# Patient Record
Sex: FEMALE | Race: WHITE | HISPANIC OR LATINO | Employment: FULL TIME | URBAN - METROPOLITAN AREA
[De-identification: names, ages, dates, MRNs, and addresses within clinical notes are randomized per-mention and may not be internally consistent; named-entity substitution may affect disease eponyms.]

---

## 2017-10-11 ENCOUNTER — OCCUPATIONAL MEDICINE (OUTPATIENT)
Dept: URGENT CARE | Facility: PHYSICIAN GROUP | Age: 27
End: 2017-10-11
Payer: COMMERCIAL

## 2017-10-11 VITALS
HEART RATE: 77 BPM | RESPIRATION RATE: 16 BRPM | BODY MASS INDEX: 22.08 KG/M2 | DIASTOLIC BLOOD PRESSURE: 60 MMHG | SYSTOLIC BLOOD PRESSURE: 108 MMHG | TEMPERATURE: 98.3 F | WEIGHT: 120 LBS | OXYGEN SATURATION: 98 % | HEIGHT: 62 IN

## 2017-10-11 DIAGNOSIS — M54.50 ACUTE MIDLINE LOW BACK PAIN WITHOUT SCIATICA: ICD-10-CM

## 2017-10-11 DIAGNOSIS — S30.0XXA CONTUSION OF LOWER BACK, INITIAL ENCOUNTER: Primary | ICD-10-CM

## 2017-10-11 DIAGNOSIS — Z02.1 PRE-EMPLOYMENT DRUG SCREENING: ICD-10-CM

## 2017-10-11 LAB
BREATH ALCOHOL COMMENT: NORMAL
POC BREATHALIZER: 0 PERCENT (ref 0–0.01)

## 2017-10-11 PROCEDURE — 99203 OFFICE O/P NEW LOW 30 MIN: CPT | Mod: 29,25 | Performed by: PHYSICIAN ASSISTANT

## 2017-10-11 PROCEDURE — 82075 ASSAY OF BREATH ETHANOL: CPT | Mod: 29 | Performed by: PHYSICIAN ASSISTANT

## 2017-10-11 PROCEDURE — 80305 DRUG TEST PRSMV DIR OPT OBS: CPT | Mod: 29 | Performed by: PHYSICIAN ASSISTANT

## 2017-10-11 ASSESSMENT — ENCOUNTER SYMPTOMS
NEUROLOGICAL NEGATIVE: 1
CARDIOVASCULAR NEGATIVE: 1
PSYCHIATRIC NEGATIVE: 1
RESPIRATORY NEGATIVE: 1
MYALGIAS: 1
BACK PAIN: 1
EYES NEGATIVE: 1
CONSTITUTIONAL NEGATIVE: 1
GASTROINTESTINAL NEGATIVE: 1

## 2017-10-11 ASSESSMENT — PAIN SCALES - GENERAL: PAINLEVEL: 6=MODERATE PAIN

## 2017-10-11 NOTE — LETTER
"EMPLOYEE’S CLAIM FOR COMPENSATION/ REPORT OF INITIAL TREATMENT  FORM C-4    EMPLOYEE’S CLAIM - PROVIDE ALL INFORMATION REQUESTED   First Name  Ada Last Name  Sharri Odonnell Birthdate                    1990                Sex  female Claim Number   Home Address  [address] Age  27 y.o. 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 (if applicable)  picking product    How did this injury or occupational disease occur? (Be specific an answer in detail. Use additional sheet if necessary)  injured my lower back, a product fell on me.   If you believe that you have an occupational disease, when did you first have knowledge of the disability and it relationship to your employment?  no Witnesses to the Accident  no      Nature of Injury or Occupational Disease  Strain  Part(s) of Body Injured or Affected  Lower Back Area (Lumbar Area & Lumbo-Sacral), Hip (L), Hip (R)    I certify that the above is true and correct to the best of my knowledge and that I have provided this information in order to obtain the benefits of [place]’s [place] Insurance and " [place] or [place] of [place]  I hereby authorize any physician, chiropractor, surgeon, practitioner, or other person, any hospital, including [facility] or [facility], any medical service organization, any insurance company, or other institution or organization to release to each other, any medical or other information, including benefits paid or payable, pertinent to this injury or disease, except information relative to diagnosis, treatment and/or counseling for AIDS, psychological conditions, alcohol or controlled substances, for which I must give specific authorization.  A Photostat of this authorization shall be as valid as the original.     Date   Place   Employee’s Signature   THIS REPORT MUST BE COMPLETED AND MAILED WITHIN 3 WORKING DAYS OF TREATMENT   Place  [facility]  Name of Facility  [facility]   Date  10/11/2017 Diagnosis  (S30.0XXA) Contusion of lower back, initial encounter  (primary encounter diagnosis)  (Z02.1) Pre-employment drug screening  (M54.5) Acute midline low back pain without sciatica Is there evidence the injured employee was under the influence of alcohol and/or another controlled substance at the time of accident?   Hour  5:28 PM Description of Injury or Disease  The primary encounter diagnosis was Contusion of lower back, initial encounter. Diagnoses of Pre-employment drug screening and Acute midline low back pain without sciatica were also pertinent to this visit. No   Treatment   Tylenol 500mg every 6 hours as needed for pain.  Heat and ice.  Stretches.  Have you advised the patient to remain off work five days or more? No   X-Ray Findings      If Yes   From Date  To Date      From information given by the employee, together with medical evidence, can you directly connect this injury or occupational disease as job incurred?  Yes If No Full Duty  No Modified Duty  Yes   Is additional medical  "care by a physician indicated?  Yes If Modified Duty, Specify any Limitations / Restrictions  Recommend limiting weight lifting to 10lbs.  No squatting, climbing, stooping, and bending.   Do you know of any previous injury or disease contributing to this condition or occupational disease?                            No   Date  10/11/2017 Print Doctor’s Name Memo Stewart P.A.-C. I certify the employer’s copy of  this form was mailed on:   Address  [address] [place] [address]. [address] Insurer’s Use Only     [facility]  [address]    Provider’s Tax ID Number  [id number] Telephone  Dept: [phone]            e-Signature: Dr. Jordi Dias, Medical Director Degree  [place]        ORIGINAL-TREATING PHYSICIAN OR CHIROPRACTOR    PAGE 2-INSURER/TPA    PAGE 3-EMPLOYER    PAGE 4-EMPLOYEE             Form C-4 (rev10/07)              BRIEF DESCRIPTION OF RIGHTS AND BENEFITS  (Pursuant to NRS 616C.050)    Notice of Injury or Occupational Disease (Incident Report Form C-1): If an injury or occupational disease (OD) arises out of and in the  course of employment, you must provide written notice to your employer as soon as practicable, but no later than 7 days after the accident or  OD. Your employer shall maintain a sufficient supply of the required forms.    Claim for Compensation (Form C-4): If medical treatment is sought, the form C-4 is available at the place of initial treatment. A completed  \"Claim for Compensation\" (Form C-4) must be filed within 90 days after an accident or OD. The treating physician or chiropractor must,  within 3 working days after treatment, complete and mail to the employer, the employer's insurer and third-party , the Claim for  Compensation.    Medical Treatment: If you require medical treatment for your on-the-job injury or OD, you may be required to select a physician or  chiropractor from a list provided by your workers’ compensation insurer, if it has contracted with an " Organization for Managed Care (MCO) or  Preferred Provider Organization (PPO) or providers of health care. If your employer has not entered into a contract with an MCO or PPO, you  may select a physician or chiropractor from the Panel of Physicians and Chiropractors. Any medical costs related to your industrial injury or  OD will be paid by your insurer.    Temporary Total Disability (TTD): If your doctor has certified that you are unable to work for a period of at least 5 consecutive days, or 5  cumulative days in a 20-day period, or places restrictions on you that your employer does not accommodate, you may be entitled to TTD  compensation.    Temporary Partial Disability (TPD): If the wage you receive upon reemployment is less than the compensation for TTD to which you are  entitled, the insurer may be required to pay you TPD compensation to make up the difference. TPD can only be paid for a maximum of 24  months.    Permanent Partial Disability (PPD): When your medical condition is stable and there is an indication of a PPD as a result of your injury or  OD, within 30 days, your insurer must arrange for an evaluation by a rating physician or chiropractor to determine the degree of your PPD. The  amount of your PPD award depends on the date of injury, the results of the PPD evaluation and your age and wage.    Permanent Total Disability (PTD): If you are medically certified by a treating physician or chiropractor as permanently and totally disabled  and have been granted a PTD status by your insurer, you are entitled to receive monthly benefits not to exceed 66 2/3% of your average  monthly wage. The amount of your PTD payments is subject to reduction if you previously received a PPD award.    Vocational Rehabilitation Services: You may be eligible for vocational rehabilitation services if you are unable to return to the job due to a  permanent physical impairment or permanent restrictions as a result of your  injury or occupational disease.    Transportation and Per Bruce Reimbursement: You may be eligible for travel expenses and per bruce associated with medical treatment.    Reopening: You may be able to reopen your claim if your condition worsens after claim closure.    Appeal Process: If you disagree with a written determination issued by the insurer or the insurer does not respond to your request, you may  appeal to the Department of Administration, , by following the instructions contained in your determination letter. You must  appeal the determination within 70 days from the date of the determination letter at [address], [address], [place], [place]  [address], or [address], [address], [place], [place] [address]. If you disagree with the  decision, you may appeal to the  Department of Administration, . You must file your appeal within 30 days from the date of the  decision  letter at [address], [address], [place], [place] [address], or [address] [facility] [address], [place] [address]. If you  disagree with a decision of an , you may file a petition for judicial review with the District Court. You must do so within 30  days of the Appeal Officer’s decision. You may be represented by an  at your own expense or you may contact the [facility] for possible  representation.    [place]  for Injured Workers (NAIW): If you disagree with a  decision, you may request that NAIW represent you  without charge at an  Hearing. For information regarding denial of benefits, you may contact the [facility] at: [address], [address], [place] [address], [phone], or [address] [facility] [facility] [address], [place] [address], [phone]    To File a Complaint with the Division: If you wish to file a complaint with the  of the Division of Industrial Relations  (DIR),  please contact the Workers’ Compensation Section, [address], [address], [place], [place] [address], telephone [phone], or  [address], [address], [place], [place] [address], telephone [phone].    For assistance with Workers’ Compensation Issues: you may contact the Office of the [facility] Consumer Health Assistance, [address] [place] [address], [address], [place], [place] [address], Toll Free 1-974.869.6854, Web site: http://govcha.[facility].nv., E-mail  [email or web address].                                                                                                                                                                                                                                   __________________________________________________________________                                                                   _________________                Employee Name / Signature                                                                                                                                                       Date                                                                                                                                                                                                     D-2 (rev. 10/07)

## 2017-10-11 NOTE — LETTER
[facility]  [address] - [place] [address]  Phone:  [phone] - Fax:  [phone]   [facility] Progress Report and Disability Certification  Date of Service: 10/11/2017   No Show:  No  Date / Time of Next Visit: 10/16/2017 @ 4:00 PM   Claim Information   Patient Name: Carla Odonnell  Claim Number:     Employer: [employer]  Date of Injury: 10/9/2017     Insurer / TPA: [employer]  ID / SSN:     Occupation: Picking  Diagnosis: The primary encounter diagnosis was Contusion of lower back, initial encounter. Diagnoses of Pre-employment drug screening and Acute midline low back pain without sciatica were also pertinent to this visit.    Medical Information   Related to Industrial Injury? Yes    Subjective Complaints:  DOI 10/9/2017:  FRANKI: was picking up a box from knee level and a box from an above shelf 35-40lbs fell from 6 feet and landed onto the low back.  Hurts along the whole back with no radiation.  Pain is 6/10.  Has tried icy hot pads with minimal improvement.  No previous back injuries.  No second jobs.   Objective Findings: Spine: No ecchymosis or erythema or ecchymosis present.  No step-off deformities  Point ttp over the lower back.    FROM for spinal flexion, extension, lateral flexion, and rotation.  Pain with all ROM but greatest with flexion.  Strength 5/5 for LE bilaterally.    DTR 2+ and equal bilaterally for patella and achilles.    Sensation equal bilaterally to light touch for L4, L5, S1, and S2. No clonus or fasiculations noted. Neurovascular status intact, DP 2+.    Gait and station wnl, non antalgic.   Pre-Existing Condition(s):     Assessment:   Initial Visit    Status: Additional Care Required  Permanent Disability:No    Plan:   Comments:Tylenol 500m every 6 hours as needed for pain.  Heat and ice.  Stretches.    Diagnostics:      Comments:       Disability Information   Status: Released to  Restricted Duty    From:  10/11/2017  Through: 10/16/2017 Restrictions are:     Physical Restrictions   Sitting:    Standing:    Stoopin hrs/day Bendin hrs/day   Squattin hrs/day Walking:    Climbin hrs/day Pushing:      Pulling:    Other:    Reaching Above Shoulder (L):   Reaching Above Shoulder (R):       Reaching Below Shoulder (L):    Reaching Below Shoulder (R):      Not to exceed Weight Limits   Carrying(hrs): 10 Weight Limit(lb): < or = to 10 pounds Lifting(hrs): 10 Weight  Limit(lb): < or = to 10 pounds   Comments: Recommend limiting weight lifting to 10lbs.  No squatting, climbing, stooping, and bending.    Repetitive Actions   Hands: i.e. Fine Manipulations from Grasping:     Feet: i.e. Operating Foot Controls:     Driving / Operate Machinery:     Physician Name: Memo Stewart P.A.-C. Physician Signature:   e-Signature: Dr. Jordi Dias, Medical Director   Clinic Name / Location: [address]  [place] [address] Clinic Phone Number: Dept: [phone]   Appointment Time: 5:20 Pm Visit Start Time: 5:28 PM   Check-In Time:  5:19 Pm Visit Discharge Time:  6:09 PM   Original-Treating Physician or Chiropractor    Page 2-Insurer/TPA    Page 3-Employer    Page 4-Employee

## 2017-10-12 NOTE — PROGRESS NOTES
"Subjective:      Carla Odonnell is a 27 y.o. female who presents with Back Injury (WC IV 10/09/17 Low Back Injury. Box fell on Pt and injured low back. Pain and stiffness.)      DOI 10/9/2017:  FRANKI: was picking up a box from knee level and a box from an above shelf 35-40lbs fell from 6 feet and landed onto the low back.  Hurts along the whole back with no radiation.  Pain is 6/10.  Has tried icy hot pads with minimal improvement.  No previous back injuries.  No second jobs.     HPI    Review of Systems   Constitutional: Negative.    HENT: Negative.    Eyes: Negative.    Respiratory: Negative.    Cardiovascular: Negative.    Gastrointestinal: Negative.    Genitourinary: Negative.    Musculoskeletal: Positive for back pain and myalgias.   Skin: Negative.    Neurological: Negative.    Endo/Heme/Allergies: Negative.    Psychiatric/Behavioral: Negative.           Objective:     /60   Pulse 77   Temp 36.8 °C (98.3 °F)   Resp 16   Ht 1.575 m (5' 2\")   Wt 54.4 kg (120 lb)   SpO2 98%   Breastfeeding? No   BMI 21.95 kg/m²      Physical Exam     Nursing note and vitals reviewed.    Constitutional:  Appropriately groomed, pleasant affect, well nourished, and in no acute distress.    HEENT:  Head: Atraumatic, normocephalic.    Ears:  Hearing grossly intact to voice.    Eyes:  Conjunctivae clear, sclera white, and medial canthus without exudate bilaterally.    Lungs:  Lungs with normal respiratory excursion and effort.      Spine: No ecchymosis or erythema or ecchymosis present.  No step-off deformities  Point ttp over the lower back.    FROM for spinal flexion, extension, lateral flexion, and rotation.  Pain with all ROM but greatest with flexion.  Strength 5/5 for LE bilaterally.    DTR 2+ and equal bilaterally for patella and achilles.    Sensation equal bilaterally to light touch for L4, L5, S1, and S2. No clonus or fasiculations noted. Neurovascular status intact, DP 2+.    Gait and station wnl, non " antalgic.     Derm:  No rashes or lesions with good turgor pressure.     Psychiatric:  Normal judgement, mood and affect.      Assessment/Plan:     2. Acute midline low back pain without sciatica      3. Contusion of lower back, initial encounter  Patient presents with acute low back pain and contusion after a 30/5/40 pound box landed from approximately 6 feet above the patient was bent forward lifting a box. On exam no tenderness along the spine. Pain along the erector spinae. No focal neurological deficits. No change in bowel or bladder habits, saddle anesthesia, paresthesias, or numbness. Recommended heat and ice and Tylenol. Patient is approximately 2 months pregnant.  Work restrictions per D39. Patient follow-up on Monday for reevaluation. May be released to full capacity for trial or discharge if showing MMI.

## 2017-10-12 NOTE — PATIENT INSTRUCTIONS
Tylenol or acetaminophen: 650mg every 6-8 hours.      Contusión   (Contusion)   Katherin contusión es un hematoma interno. Las contusiones ocurren cuando un traumatismo causa un sangrado debajo de la piel. Los signos de hematoma son dolor, inflamación (hinchazón) y cambio de color en la piel. La contusión puede volverse austyn, púrpura o amarilla.  CUIDADOS EN EL HOGAR   · Aplique hielo sobre la laureen lesionada.  ¨ Ponga el hielo en katherin bolsa plástica.  ¨ Colóquese katherin toalla entre la piel y la bolsa de hielo.  ¨ Deje el hielo karthik 15 a 20 minutos, 3 a 4 veces por día.  · Sólo tome los medicamentos según le indique el médico.  · Caitie que la laureen lesionada repose.  · En lo posible, levante (eleve) la laureen lesionada para disminuir la hinchazón.  SOLICITE AYUDA DE INMEDIATO SI:   · El hematoma o la hinchazón aumentan.  · Siente que el dolor empeora.  · La hinchazón o el dolor no se alivian con los medicamentos.  ASEGÚRESE DE QUE:   · Comprende estas instrucciones.  · Controlará wood enfermedad.  · Solicitará ayuda de inmediato si no mejora o si empeora.     Esta información no tiene hailey fin reemplazar el consejo del médico. Asegúrese de hacerle al médico cualquier pregunta que tenga.     Document Released: 12/06/2012 Document Revised: 03/11/2013  Elsevier Interactive Patient Education ©2016 Elsevier Inc.

## 2017-10-16 ENCOUNTER — OCCUPATIONAL MEDICINE (OUTPATIENT)
Dept: URGENT CARE | Facility: PHYSICIAN GROUP | Age: 27
End: 2017-10-16
Payer: COMMERCIAL

## 2017-10-16 VITALS
WEIGHT: 120 LBS | RESPIRATION RATE: 16 BRPM | TEMPERATURE: 98.6 F | HEART RATE: 78 BPM | HEIGHT: 62 IN | DIASTOLIC BLOOD PRESSURE: 54 MMHG | BODY MASS INDEX: 22.08 KG/M2 | SYSTOLIC BLOOD PRESSURE: 94 MMHG | OXYGEN SATURATION: 99 %

## 2017-10-16 DIAGNOSIS — S20.229D CONTUSION OF BACK, UNSPECIFIED LATERALITY, SUBSEQUENT ENCOUNTER: ICD-10-CM

## 2017-10-16 PROCEDURE — 99213 OFFICE O/P EST LOW 20 MIN: CPT | Mod: 29 | Performed by: PHYSICIAN ASSISTANT

## 2017-10-16 ASSESSMENT — ENCOUNTER SYMPTOMS
CONSTITUTIONAL NEGATIVE: 1
NEUROLOGICAL NEGATIVE: 1
GASTROINTESTINAL NEGATIVE: 1

## 2017-10-16 ASSESSMENT — PAIN SCALES - GENERAL: PAINLEVEL: NO PAIN

## 2017-10-16 NOTE — LETTER
"   [facility] [address] - [place] [address]  Phone:  [phone] - Fax:  [phone]   [facility] Progress Report and Disability Certification  Date of Service: 10/16/2017   No Show:  No  Date / Time of Next Visit:     Claim Information   Patient Name: Carla Odonnell  Claim Number:     Employer: [employer]  Date of Injury: 10/9/2017     Insurer / TPA:   [employer]  ID / SSN:     Occupation: Picking  Diagnosis: The encounter diagnosis was Contusion of back, unspecified laterality, subsequent encounter.    Medical Information   Related to Industrial Injury? Yes    Subjective Complaints:  DOI 10/9/2017:  Initial follow up. \"FRANKI: was picking up a box from knee level and a box from an above shelf 35-40lbs fell from 6 feet and landed onto the low back.  Hurt along the whole back with no radiation.  Initial pain was 6/10.   Pain is currently 0/10 and she wishes to return to full duty.   She states there is no back or hip pain and has FROM.  No numbness, tingling or extremity weakness. Took Tylenol, iced with good result   Objective Findings: Vitals reviewed  Back: on inspection there is no swelling, ecchymosis or deformity.  No midline TTP.  No  paraspinous TTP mid-lower lumbar region.  FROM of back without pain. Bilateral lower extremities with 5/5 strength, normal sensation throughout and 2+ DTRs.  Normal gait.    Pre-Existing Condition(s): None   Assessment:   Condition Improved    Status: Discharged /  MMI  Permanent Disability:No    Plan:      Diagnostics:      Comments:       Disability Information   Status: Released to Full Duty    From:     Through:   Restrictions are:     Physical Restrictions   Sitting:    Standing:    Stooping:    Bending:      Squatting:    Walking:    Climbing:    Pushing:      Pulling:    Other:    Reaching Above Shoulder (L):   Reaching Above Shoulder (R):       Reaching Below Shoulder (L):    Reaching Below " Shoulder (R):      Not to exceed Weight Limits   Carrying(hrs):   Weight Limit(lb):   Lifting(hrs):   Weight  Limit(lb):     Comments: Benign exam, recovered from low back contusion/injury  Full duty  MMI    Repetitive Actions   Hands: i.e. Fine Manipulations from Grasping:     Feet: i.e. Operating Foot Controls:     Driving / Operate Machinery:     Physician Name: Josesito Sherman P.A.-C. Physician Signature: JOSESITO Browning P.A.-C. e-Signature: Dr. Jordi Dias, Medical Director   Clinic Name / Location: [address]  [place] [address] Clinic Phone Number: Dept: [phone]   Appointment Time: 4:00 Pm Visit Start Time: 5:05 PM   Check-In Time:  4:58 Pm Visit Discharge Time:  5:35PM   Original-Treating Physician or Chiropractor    Page 2-Insurer/TPA    Page 3-Employer    Page 4-Employee

## 2017-10-17 NOTE — PROGRESS NOTES
"Subjective:      Carla Odonnell is a 27 y.o. female who presents with Follow-Up (WC [facility] lower back w/ bilateral hip pain [employer].  PT states since last visit her back and hips are feeling better, with zero pain in back and hips.)      DOI 10/9/2017:  Initial follow up. \"FRANKI: was picking up a box from knee level and a box from an above shelf 35-40lbs fell from 6 feet and landed onto the low back.  Hurt along the whole back with no radiation.  Initial pain was 6/10.   Pain is currently 0/10 and she wishes to return to full duty.   She states there is no back or hip pain and has FROM.  No numbness, tingling or extremity weakness. Took Tylenol, iced with good result     HPI  As above.     Review of Systems   Constitutional: Negative.    Gastrointestinal: Negative.    Musculoskeletal:        SEE HPI   Neurological: Negative.        PMH:  has no past medical history on file.  MEDS:   Current Outpatient Prescriptions:   •  Acetaminophen (TYLENOL PO), Take  by mouth., Disp: , Rfl:   ALLERGIES: No Known Allergies  SURGHX: No past surgical history on file.  SOCHX:  reports that she has never smoked. She has never used smokeless tobacco.  FH: Family history was reviewed, no pertinent findings to report     Objective:     BP (!) 94/54   Pulse 78   Temp 37 °C (98.6 °F)   Resp 16   Ht 1.575 m (5' 2\")   Wt 54.4 kg (120 lb)   SpO2 99%   BMI 21.95 kg/m²      Physical Exam   Constitutional: She is oriented to person, place, and time. She appears well-developed and well-nourished. No distress.   Cardiovascular: Normal rate.    Pulmonary/Chest: Effort normal.   Neurological: She is alert and oriented to person, place, and time.   Skin: Skin is warm and dry.   Psychiatric: She has a normal mood and affect. Her behavior is normal.     Vitals reviewed  Back: on inspection there is no swelling, ecchymosis or deformity.  No midline TTP.  No  paraspinous TTP mid-lower lumbar region.  FROM of back without pain. Bilateral lower " extremities with 5/5 strength, normal sensation throughout and 2+ DTRs.  Normal gait.        Assessment/Plan:     1. Contusion of back, unspecified laterality, subsequent encounter         -released to full duty  -I      Ghazal Sherman P.A.-C.

## 2017-12-26 ENCOUNTER — HOSPITAL ENCOUNTER (OUTPATIENT)
Facility: MEDICAL CENTER | Age: 27
End: 2017-12-26
Attending: OBSTETRICS & GYNECOLOGY | Admitting: OBSTETRICS & GYNECOLOGY
Payer: COMMERCIAL

## 2017-12-26 VITALS — RESPIRATION RATE: 16 BRPM | TEMPERATURE: 97.4 F

## 2017-12-26 LAB
APPEARANCE UR: CLEAR
COLOR UR AUTO: YELLOW
GLUCOSE UR QL STRIP.AUTO: NEGATIVE MG/DL
KETONES UR QL STRIP.AUTO: NEGATIVE MG/DL
LEUKOCYTE ESTERASE UR QL STRIP.AUTO: NEGATIVE
NITRITE UR QL STRIP.AUTO: NEGATIVE
PH UR STRIP.AUTO: 7 [PH]
PROT UR QL STRIP: NEGATIVE MG/DL
RBC UR QL AUTO: NEGATIVE
SP GR UR: 1.01

## 2017-12-26 PROCEDURE — 81002 URINALYSIS NONAUTO W/O SCOPE: CPT

## 2017-12-26 NOTE — PROGRESS NOTES
27 y.o.    Edc=  23.3 weeks    TOCO and US on.  VSS.  Pt presents to triage with c/o VB, denies UCs or LOF and states pos FM.   Pt states the bleeding began after intercourse on Saturday and was bright red.  Pt states it is no longer red, but is now brown.  I visualized the pt's pad and there is a small amount of old blood seen.  1340-UA done-WNL, see epic.  Pt is not gilma.   Dr Rangel phoned and report given about previous findings.  Orders to dc pt home with labor precautions and general instructions to include no intercourse until she sees Dr. Cleveland.

## 2018-04-12 ENCOUNTER — HOSPITAL ENCOUNTER (INPATIENT)
Facility: MEDICAL CENTER | Age: 28
LOS: 1 days | End: 2018-04-14
Attending: OBSTETRICS & GYNECOLOGY | Admitting: OBSTETRICS & GYNECOLOGY
Payer: COMMERCIAL

## 2018-04-13 LAB
BASOPHILS # BLD AUTO: 0.4 % (ref 0–1.8)
BASOPHILS # BLD: 0.06 K/UL (ref 0–0.12)
EOSINOPHIL # BLD AUTO: 0.09 K/UL (ref 0–0.51)
EOSINOPHIL NFR BLD: 0.6 % (ref 0–6.9)
ERYTHROCYTE [DISTWIDTH] IN BLOOD BY AUTOMATED COUNT: 47.1 FL (ref 35.9–50)
HCT VFR BLD AUTO: 41.9 % (ref 37–47)
HGB BLD-MCNC: 13.9 G/DL (ref 12–16)
HOLDING TUBE BB 8507: NORMAL
IMM GRANULOCYTES # BLD AUTO: 0.19 K/UL (ref 0–0.11)
IMM GRANULOCYTES NFR BLD AUTO: 1.3 % (ref 0–0.9)
LYMPHOCYTES # BLD AUTO: 2.01 K/UL (ref 1–4.8)
LYMPHOCYTES NFR BLD: 14 % (ref 22–41)
MCH RBC QN AUTO: 29.6 PG (ref 27–33)
MCHC RBC AUTO-ENTMCNC: 33.2 G/DL (ref 33.6–35)
MCV RBC AUTO: 89.1 FL (ref 81.4–97.8)
MONOCYTES # BLD AUTO: 0.86 K/UL (ref 0–0.85)
MONOCYTES NFR BLD AUTO: 6 % (ref 0–13.4)
NEUTROPHILS # BLD AUTO: 11.1 K/UL (ref 2–7.15)
NEUTROPHILS NFR BLD: 77.7 % (ref 44–72)
NRBC # BLD AUTO: 0 K/UL
NRBC BLD-RTO: 0 /100 WBC
PLATELET # BLD AUTO: 305 K/UL (ref 164–446)
PMV BLD AUTO: 10.1 FL (ref 9–12.9)
RBC # BLD AUTO: 4.7 M/UL (ref 4.2–5.4)
WBC # BLD AUTO: 14.3 K/UL (ref 4.8–10.8)

## 2018-04-13 PROCEDURE — A9270 NON-COVERED ITEM OR SERVICE: HCPCS | Performed by: OBSTETRICS & GYNECOLOGY

## 2018-04-13 PROCEDURE — 85025 COMPLETE CBC W/AUTO DIFF WBC: CPT

## 2018-04-13 PROCEDURE — 59409 OBSTETRICAL CARE: CPT

## 2018-04-13 PROCEDURE — 700105 HCHG RX REV CODE 258: Performed by: OBSTETRICS & GYNECOLOGY

## 2018-04-13 PROCEDURE — 700102 HCHG RX REV CODE 250 W/ 637 OVERRIDE(OP): Performed by: OBSTETRICS & GYNECOLOGY

## 2018-04-13 PROCEDURE — 36415 COLL VENOUS BLD VENIPUNCTURE: CPT

## 2018-04-13 PROCEDURE — 304965 HCHG RECOVERY SERVICES

## 2018-04-13 PROCEDURE — 303615 HCHG EPIDURAL/SPINAL ANESTHESIA FOR LABOR

## 2018-04-13 PROCEDURE — 700111 HCHG RX REV CODE 636 W/ 250 OVERRIDE (IP)

## 2018-04-13 PROCEDURE — 700112 HCHG RX REV CODE 229: Performed by: OBSTETRICS & GYNECOLOGY

## 2018-04-13 PROCEDURE — 770002 HCHG ROOM/CARE - OB PRIVATE (112)

## 2018-04-13 RX ORDER — CARBOPROST TROMETHAMINE 250 UG/ML
250 INJECTION, SOLUTION INTRAMUSCULAR
Status: DISCONTINUED | OUTPATIENT
Start: 2018-04-13 | End: 2018-04-14 | Stop reason: HOSPADM

## 2018-04-13 RX ORDER — DOCUSATE SODIUM 100 MG/1
100 CAPSULE, LIQUID FILLED ORAL 2 TIMES DAILY PRN
Status: DISCONTINUED | OUTPATIENT
Start: 2018-04-13 | End: 2018-04-14 | Stop reason: HOSPADM

## 2018-04-13 RX ORDER — MAG HYDROX/ALUMINUM HYD/SIMETH 400-400-40
1 SUSPENSION, ORAL (FINAL DOSE FORM) ORAL
Status: DISCONTINUED | OUTPATIENT
Start: 2018-04-13 | End: 2018-04-14 | Stop reason: HOSPADM

## 2018-04-13 RX ORDER — MISOPROSTOL 200 UG/1
800 TABLET ORAL
Status: DISCONTINUED | OUTPATIENT
Start: 2018-04-13 | End: 2018-04-13 | Stop reason: HOSPADM

## 2018-04-13 RX ORDER — ONDANSETRON 2 MG/ML
4 INJECTION INTRAMUSCULAR; INTRAVENOUS EVERY 6 HOURS PRN
Status: DISCONTINUED | OUTPATIENT
Start: 2018-04-13 | End: 2018-04-14 | Stop reason: HOSPADM

## 2018-04-13 RX ORDER — ONDANSETRON 4 MG/1
4 TABLET, ORALLY DISINTEGRATING ORAL EVERY 6 HOURS PRN
Status: DISCONTINUED | OUTPATIENT
Start: 2018-04-13 | End: 2018-04-14 | Stop reason: HOSPADM

## 2018-04-13 RX ORDER — OXYCODONE AND ACETAMINOPHEN 10; 325 MG/1; MG/1
1 TABLET ORAL EVERY 4 HOURS PRN
Status: DISCONTINUED | OUTPATIENT
Start: 2018-04-13 | End: 2018-04-14 | Stop reason: HOSPADM

## 2018-04-13 RX ORDER — OXYCODONE HYDROCHLORIDE AND ACETAMINOPHEN 5; 325 MG/1; MG/1
1 TABLET ORAL EVERY 4 HOURS PRN
Status: DISCONTINUED | OUTPATIENT
Start: 2018-04-13 | End: 2018-04-14 | Stop reason: HOSPADM

## 2018-04-13 RX ORDER — BISACODYL 10 MG
10 SUPPOSITORY, RECTAL RECTAL PRN
Status: DISCONTINUED | OUTPATIENT
Start: 2018-04-13 | End: 2018-04-14 | Stop reason: HOSPADM

## 2018-04-13 RX ORDER — SODIUM CHLORIDE, SODIUM LACTATE, POTASSIUM CHLORIDE, CALCIUM CHLORIDE 600; 310; 30; 20 MG/100ML; MG/100ML; MG/100ML; MG/100ML
INJECTION, SOLUTION INTRAVENOUS CONTINUOUS
Status: DISCONTINUED | OUTPATIENT
Start: 2018-04-13 | End: 2018-04-13 | Stop reason: HOSPADM

## 2018-04-13 RX ORDER — METHYLERGONOVINE MALEATE 0.2 MG/ML
0.2 INJECTION INTRAVENOUS
Status: DISCONTINUED | OUTPATIENT
Start: 2018-04-13 | End: 2018-04-14 | Stop reason: HOSPADM

## 2018-04-13 RX ORDER — IBUPROFEN 600 MG/1
600 TABLET ORAL EVERY 6 HOURS PRN
Status: DISCONTINUED | OUTPATIENT
Start: 2018-04-13 | End: 2018-04-14 | Stop reason: HOSPADM

## 2018-04-13 RX ORDER — ALUMINA, MAGNESIA, AND SIMETHICONE 2400; 2400; 240 MG/30ML; MG/30ML; MG/30ML
30 SUSPENSION ORAL EVERY 6 HOURS PRN
Status: DISCONTINUED | OUTPATIENT
Start: 2018-04-13 | End: 2018-04-13 | Stop reason: HOSPADM

## 2018-04-13 RX ORDER — IBUPROFEN 800 MG/1
800 TABLET ORAL EVERY 6 HOURS PRN
Status: DISCONTINUED | OUTPATIENT
Start: 2018-04-13 | End: 2018-04-14

## 2018-04-13 RX ORDER — VITAMIN A ACETATE, BETA CAROTENE, ASCORBIC ACID, CHOLECALCIFEROL, .ALPHA.-TOCOPHEROL ACETATE, DL-, THIAMINE MONONITRATE, RIBOFLAVIN, NIACINAMIDE, PYRIDOXINE HYDROCHLORIDE, FOLIC ACID, CYANOCOBALAMIN, CALCIUM CARBONATE, FERROUS FUMARATE, ZINC OXIDE, CUPRIC OXIDE 3080; 12; 120; 400; 1; 1.84; 3; 20; 22; 920; 25; 200; 27; 10; 2 [IU]/1; UG/1; MG/1; [IU]/1; MG/1; MG/1; MG/1; MG/1; MG/1; [IU]/1; MG/1; MG/1; MG/1; MG/1; MG/1
1 TABLET, FILM COATED ORAL EVERY MORNING
Status: DISCONTINUED | OUTPATIENT
Start: 2018-04-13 | End: 2018-04-14 | Stop reason: HOSPADM

## 2018-04-13 RX ORDER — BUPIVACAINE HYDROCHLORIDE 2.5 MG/ML
INJECTION, SOLUTION EPIDURAL; INFILTRATION; INTRACAUDAL
Status: ACTIVE
Start: 2018-04-13 | End: 2018-04-13

## 2018-04-13 RX ORDER — ROPIVACAINE HYDROCHLORIDE 2 MG/ML
INJECTION, SOLUTION EPIDURAL; INFILTRATION; PERINEURAL
Status: COMPLETED
Start: 2018-04-13 | End: 2018-04-13

## 2018-04-13 RX ORDER — ACETAMINOPHEN 325 MG/1
325 TABLET ORAL EVERY 4 HOURS PRN
Status: DISCONTINUED | OUTPATIENT
Start: 2018-04-13 | End: 2018-04-14 | Stop reason: HOSPADM

## 2018-04-13 RX ORDER — OXYCODONE HYDROCHLORIDE AND ACETAMINOPHEN 5; 325 MG/1; MG/1
1 TABLET ORAL EVERY 4 HOURS PRN
Status: DISCONTINUED | OUTPATIENT
Start: 2018-04-13 | End: 2018-04-14

## 2018-04-13 RX ORDER — SODIUM CHLORIDE, SODIUM LACTATE, POTASSIUM CHLORIDE, CALCIUM CHLORIDE 600; 310; 30; 20 MG/100ML; MG/100ML; MG/100ML; MG/100ML
INJECTION, SOLUTION INTRAVENOUS PRN
Status: DISCONTINUED | OUTPATIENT
Start: 2018-04-13 | End: 2018-04-14 | Stop reason: HOSPADM

## 2018-04-13 RX ORDER — MISOPROSTOL 200 UG/1
600 TABLET ORAL
Status: DISCONTINUED | OUTPATIENT
Start: 2018-04-13 | End: 2018-04-14 | Stop reason: HOSPADM

## 2018-04-13 RX ADMIN — SODIUM CHLORIDE, POTASSIUM CHLORIDE, SODIUM LACTATE AND CALCIUM CHLORIDE: 600; 310; 30; 20 INJECTION, SOLUTION INTRAVENOUS at 01:25

## 2018-04-13 RX ADMIN — Medication 125 ML/HR: at 06:15

## 2018-04-13 RX ADMIN — OXYCODONE HYDROCHLORIDE AND ACETAMINOPHEN 1 TABLET: 10; 325 TABLET ORAL at 18:19

## 2018-04-13 RX ADMIN — OXYCODONE HYDROCHLORIDE AND ACETAMINOPHEN 1 TABLET: 10; 325 TABLET ORAL at 12:06

## 2018-04-13 RX ADMIN — OXYCODONE HYDROCHLORIDE AND ACETAMINOPHEN 1 TABLET: 5; 325 TABLET ORAL at 23:59

## 2018-04-13 RX ADMIN — SODIUM CHLORIDE, POTASSIUM CHLORIDE, SODIUM LACTATE AND CALCIUM CHLORIDE: 600; 310; 30; 20 INJECTION, SOLUTION INTRAVENOUS at 01:50

## 2018-04-13 RX ADMIN — IBUPROFEN 600 MG: 600 TABLET, FILM COATED ORAL at 18:19

## 2018-04-13 RX ADMIN — ROPIVACAINE HYDROCHLORIDE 100 ML: 2 INJECTION, SOLUTION EPIDURAL; INFILTRATION at 02:00

## 2018-04-13 RX ADMIN — DOCUSATE SODIUM 100 MG: 100 CAPSULE ORAL at 23:59

## 2018-04-13 RX ADMIN — Medication 2000 ML/HR: at 05:32

## 2018-04-13 RX ADMIN — IBUPROFEN 600 MG: 600 TABLET, FILM COATED ORAL at 08:34

## 2018-04-13 RX ADMIN — Medication 1 TABLET: at 08:34

## 2018-04-13 ASSESSMENT — LIFESTYLE VARIABLES
ALCOHOL_USE: NO
EVER_SMOKED: NEVER
EVER_SMOKED: NEVER
DO YOU DRINK ALCOHOL: NO

## 2018-04-13 ASSESSMENT — PATIENT HEALTH QUESTIONNAIRE - PHQ9
2. FEELING DOWN, DEPRESSED, IRRITABLE, OR HOPELESS: NOT AT ALL
1. LITTLE INTEREST OR PLEASURE IN DOING THINGS: NOT AT ALL
SUM OF ALL RESPONSES TO PHQ9 QUESTIONS 1 AND 2: 0

## 2018-04-13 ASSESSMENT — PAIN SCALES - GENERAL
PAINLEVEL_OUTOF10: 0
PAINLEVEL_OUTOF10: 2
PAINLEVEL_OUTOF10: 7
PAINLEVEL_OUTOF10: 6
PAINLEVEL_OUTOF10: 0
PAINLEVEL_OUTOF10: 0
PAINLEVEL_OUTOF10: 6

## 2018-04-13 NOTE — CARE PLAN
Problem: Altered physiologic condition related to immediate post-delivery state and potential for bleeding/hemorrhage  Goal: Patient physiologically stable as evidenced by normal lochia, palpable uterine involution and vital signs within normal limits  Outcome: PROGRESSING AS EXPECTED  Uterus firm, lochia light, pain controlled with PRN mediation (see MAR). VSS and WDL.     Problem: Potential for postpartum infection related to presence of episiotomy/vaginal tear and/or uterine contamination  Goal: Patient will be absent from signs and symptoms of infection  Outcome: PROGRESSING AS EXPECTED  No signs or symptoms of infection. VSS and WDL.

## 2018-04-13 NOTE — DELIVERY NOTE
"Delivery Summary    Carla Odonnell is a 26 yo  who presented at 38wk5d in active labor. She received an epidural for anesthesia. She progressed to complete dilation and spontaneously ruptured her membranes. After 15 minutes of pushing she underwent a spontaneous vaginal delivery of a viable female infant \"Raya Polanco\" in RAZIA position with APGARS 8/8. Infant was placed on maternal chest. Cord was clamped and cut after 30 second delay. The placenta was delivered with gentle traction. The uterus firmed with fundal pressure and pitocin. The perineum was examined and found to be intact.    EBL: 350cc  Anesthesia: epidural  Complications: None    Liz Cleveland M.D.      "

## 2018-04-13 NOTE — PROGRESS NOTES
0110 Report received from JUAN DANIEL Brewer RN at bedside and assumed care. POC discussed with pt and s/o and encouraged to state needs or questions at any time.    0155 Dr. Bean at bedside to place epidural. Pt tolerated well.    0226 SVE by RN 7/100/-1    0356 Pt states feeling pressure. SVE by RN 8-/100/-1. Dr. Cleveland called and given update.    0436 SROM clear fluid    0454 Pt states feeling stronger pressure. SVE by RN C/+2. Dr. Cleveland called and given update.    0515 RN and Dr. Cleveland at bedside through delivery. Pt started pushing.    0527  of viable female infant apgars 8/8    0532 delivery of placenta S/I    0700  Report given to SALLY Canseco RN at bedside.

## 2018-04-13 NOTE — CARE PLAN
Problem: Pain  Goal: Alleviation of Pain or a reduction in pain to the patient's comfort goal  Outcome: PROGRESSING AS EXPECTED  Pt states feeling comfortable and free of pain with epidural in place. Will continue to assess.    Problem: Risk for Infection, Impaired Wound Healing  Goal: Remain free from signs and symptoms of infection  Outcome: PROGRESSING AS EXPECTED  Pt is afebrile and free from s/s of infection at this time. Will continue to assess.

## 2018-04-13 NOTE — PROGRESS NOTES
Patient arrived on unit at 0825 and to room 301. Report received from Gege Canseco Labor and Delivery RN. Assessment complete. Uterus firm, lochia light. VSS and WDL. Patient oriented to room and unit. FOB also at bedside. Call light within reach, and encouraged to call for additional needs.

## 2018-04-13 NOTE — PROGRESS NOTES
EDC  38w5d. Pt presents to L&D with complaints of UC's. Pt taken to triage for assessment.      TOCO and EFM applied, VSS. PT states she has been gilma since around 1600. Pt reports +FM, denies LOF or VB. Pt states she was 2cm in the office last week. SVE 3/60/-2. Will update Dr. Coelho on pt status.     2300 Pt flat on back, repositioned to side    2315 Pt flat on back, repositioned to side, educated on positioning and FHT    2320 Dr. Coelho updated on pt status, once reactive tracing obtained okay to discharge pt home with labor precautions.     2332 RN at bedside, pt flat on back again, pt asking if she can stand at bedside, RN educated she could stand and move around.    2345 Dr. Coelho updated on FHT, will continue to monitor.    0015 RN at bedside, pt throwing up and visibly more uncomfortable, SVE /-2. Will recheck SVE at 0100    0100 Dr. Coelho updated on pt status, orders for admission received. Dr. Coelho notified that we have been unable to obtain GBS, Dr. Coelho declines to treat at this time. Pt to be transferred to S215    0105 Dr. Coelho called, was able to find GBS - negative

## 2018-04-13 NOTE — H&P
DATE OF ADMISSION:  04/13/2018    CHIEF COMPLAINT:  Labor.    HISTORY OF PRESENT ILLNESS:  This is a 27-year-old G3, P1-0-1-1 with   intrauterine pregnancy at term, comes in with complaints of active labor.  She   was initially 3 cm and changed to 5 cm.  She otherwise has good fetal   movement.  No vaginal bleeding, no loss of fluid, no signs or symptoms of   preeclampsia.  She relates an uncomplicated pregnancy.  She is Rh positive and   GBS negative.    PAST MEDICAL HISTORY:  None.    FAMILY HISTORY:  None.    PAST SURGICAL HISTORY:  None.    ALLERGIES:  No known drug allergies.    SOCIAL HISTORY:  Denies tobacco, alcohol, or drug use.    MEDICATIONS:  Prenatal vitamin.    SEXUAL HISTORY:  Denies history of herpes for herself or father of the baby.    REVIEW OF SYSTEMS:  As in HPI, otherwise negative for greater than 10 systems.    Patient did not have any chest pain nor heart palpitations.  No signs or   symptoms of DVT.    PHYSICAL EXAMINATION:  VITAL SIGNS:  Blood pressure 118/62, respirations 12, temperature 98.7, weight   140 pounds.  GENERAL:  Patient appears in pain with contractions.  ABDOMEN:  Gravid, nontender.  GENITOURINARY:  External genitalia is normal, no lesions.  Vagina is normal,   no lesions.  Cervix per nurse is 5 cm dilated.  EXTREMITIES:  No signs or symptoms of DVT.    ASSESSMENT:  1.  Intrauterine pregnancy at term.  2.  Active labor.  3.  Group B Strep negative.    PLAN:  Plan to admit the patient and undergo expectant management, planning an   epidural for pain control.  Her estimated fetal weight is around 7-1/2   pounds.  We expect spontaneous vaginal delivery.       ____________________________________     MD ARGENTINA GONZALEZ / NTS    DD:  04/13/2018 01:12:18  DT:  04/13/2018 01:26:59    D#:  [id number]  Job#:  [id number]    cc: [facility]

## 2018-04-14 VITALS
DIASTOLIC BLOOD PRESSURE: 65 MMHG | SYSTOLIC BLOOD PRESSURE: 104 MMHG | BODY MASS INDEX: 26.43 KG/M2 | HEART RATE: 58 BPM | WEIGHT: 140 LBS | OXYGEN SATURATION: 97 % | RESPIRATION RATE: 18 BRPM | HEIGHT: 61 IN | TEMPERATURE: 97.3 F

## 2018-04-14 LAB
ERYTHROCYTE [DISTWIDTH] IN BLOOD BY AUTOMATED COUNT: 50.7 FL (ref 35.9–50)
HCT VFR BLD AUTO: 39.8 % (ref 37–47)
HGB BLD-MCNC: 13.2 G/DL (ref 12–16)
MCH RBC QN AUTO: 30.3 PG (ref 27–33)
MCHC RBC AUTO-ENTMCNC: 33.2 G/DL (ref 33.6–35)
MCV RBC AUTO: 91.5 FL (ref 81.4–97.8)
PLATELET # BLD AUTO: 282 K/UL (ref 164–446)
PMV BLD AUTO: 10.1 FL (ref 9–12.9)
RBC # BLD AUTO: 4.35 M/UL (ref 4.2–5.4)
WBC # BLD AUTO: 13.3 K/UL (ref 4.8–10.8)

## 2018-04-14 PROCEDURE — 85027 COMPLETE CBC AUTOMATED: CPT

## 2018-04-14 PROCEDURE — 36415 COLL VENOUS BLD VENIPUNCTURE: CPT

## 2018-04-14 PROCEDURE — A9270 NON-COVERED ITEM OR SERVICE: HCPCS | Performed by: OBSTETRICS & GYNECOLOGY

## 2018-04-14 PROCEDURE — 700102 HCHG RX REV CODE 250 W/ 637 OVERRIDE(OP): Performed by: OBSTETRICS & GYNECOLOGY

## 2018-04-14 RX ORDER — IBUPROFEN 600 MG/1
600 TABLET ORAL EVERY 6 HOURS PRN
Qty: 30 TAB | Refills: 1 | Status: SHIPPED | OUTPATIENT
Start: 2018-04-14

## 2018-04-14 RX ADMIN — OXYCODONE HYDROCHLORIDE AND ACETAMINOPHEN 1 TABLET: 5; 325 TABLET ORAL at 07:33

## 2018-04-14 RX ADMIN — Medication 1 TABLET: at 07:32

## 2018-04-14 RX ADMIN — IBUPROFEN 600 MG: 600 TABLET, FILM COATED ORAL at 07:32

## 2018-04-14 ASSESSMENT — PATIENT HEALTH QUESTIONNAIRE - PHQ9
SUM OF ALL RESPONSES TO PHQ9 QUESTIONS 1 AND 2: 0
2. FEELING DOWN, DEPRESSED, IRRITABLE, OR HOPELESS: NOT AT ALL
1. LITTLE INTEREST OR PLEASURE IN DOING THINGS: NOT AT ALL

## 2018-04-14 ASSESSMENT — PAIN SCALES - GENERAL
PAINLEVEL_OUTOF10: 0
PAINLEVEL_OUTOF10: 2
PAINLEVEL_OUTOF10: 0
PAINLEVEL_OUTOF10: 6

## 2018-04-14 ASSESSMENT — LIFESTYLE VARIABLES
EVER_SMOKED: NEVER
DO YOU DRINK ALCOHOL: NO

## 2018-04-14 NOTE — PROGRESS NOTES
PPD#1  S) pt without c/o  O) vss  Fundus: firm  Ext: no edema   Hgb: pending  A/P PPD#1, s/p   Stable - desires d/c home

## 2018-04-14 NOTE — DISCHARGE INSTRUCTIONS
POSTPARTUM DISCHARGE INSTRUCTIONS FOR MOM    YOB: 1990   Age: 27 y.o.               Admit Date: 2018     Discharge Date: 2018  Attending Doctor:  Liz Cleveland M.D.                  Allergies:  Patient has no known allergies.    Discharged to home by car. Discharged via wheelchair, hospital escort: Yes.  Special equipment needed: Not Applicable  Belongings with: Personal  Be sure to schedule a follow-up appointment with your primary care doctor or any specialists as instructed.     Discharge Plan:   Diet Plan: Discussed  Activity Level: Discussed  Confirmed Follow up Appointment: Patient to Call and Schedule Appointment  Confirmed Symptoms Management: Discussed  Medication Reconciliation Updated: Yes  Influenza Vaccine Indication: Not indicated: Previously immunized this influenza season and > 8 years of age    REASONS TO CALL YOUR OBSTETRICIAN:  1.   Persistent fever or shaking chills (Temperature higher than 100.4)  2.   Heavy bleeding (soaking more than 1 pad per hour); Passing clots  3.   Foul odor from vagina  4.   Mastitis (Breast infection; breast pain, chills, fever, redness)  5.   Urinary pain, burning or frequency  6.   Episiotomy infection  7.   Abdominal incision infection  8.   Severe depression longer than 24 hours    HAND WASHING  · Prior to handling the baby.  · Before breastfeeding or bottle feeding baby.  · After using the bathroom or changing the baby's diaper.    WOUND CARE  Ask your physician for additional care instructions.  In general:    ·  Incision:      · Keep clean and dry.    · Do NOT lift anything heavier than your baby for up to 6 weeks.    · There should not be any opening or pus.      VAGINAL CARE  · Nothing inside vagina for 6 weeks: no sexual intercourse, tampons or douching.  · Bleeding may continue for 2-4 weeks.  Amount may vary.    · Call your physician for heavy bleeding which means soaking more than 1 pad per hour    BIRTH CONTROL  · It is  "possible to become pregnant at any time after delivery and while breastfeeding.  · Plan to discuss a method of birth control with your physician at your follow up visit. visit.    DIET AND ELIMINATION  · Eating more fiber (bran cereal, fruits, and vegetables) and drinking plenty of fluids will help to avoid constipation.  · Urinary frequency after childbirth is normal.    POSTPARTUM BLUES  During the first few days after birth, you may experience a sense of the \"blues\" which may include impatience, irritability or even crying.  These feeling come and go quickly.  However, as many as 1 in 10 women experience emotional symptoms known as postpartum depression.    Postpartum depression:  May start as early as the second or third day after delivery or take several weeks or months to develop.  Symptoms of \"blues\" are present, but are more intense:  Crying spells; loss of appetite; feelings of hopelessness or loss of control; fear of touching the baby; over concern or no concern at all about the baby; little or no concern about your own appearance/caring for yourself; and/or inability to sleep or excessive sleeping.  Contact your physician if you are experiencing any of these symptoms.    Crisis Hotline:  · [place] Crisis Hotline:  6-514-GTNFXTT  Or 1-396.861.8890  · [place] Crisis Hotline:  1-134.906.4090  Or 154-130-9099    PREVENTING SHAKEN BABY:  If you are angry or stressed, PUT THE BABY IN THE CRIB, step away, take some deep breaths, and wait until you are calm to care for the baby.  DO NOT SHAKE THE BABY.  You are not alone, call a supporter for help.    · Crisis Call Center 24/7 crisis line 340-929-7613 or 1-228.212.6970  · You can also text them, text \"ANSWER\" to 001922    QUIT SMOKING/TOBACCO USE:  I understand the use of any tobacco products increases my chance of suffering from future heart disease and could cause other illnesses which may shorten my life. Quitting the use of tobacco products is the single most " important thing I can do to improve my health. For further information on smoking / tobacco cessation call a Toll Free Quit Line at 1-365.662.8307 (*[employer] [place]) or 1-277.376.3976 (American [employer] Association) or you can access the web based program at www.lungusa.org.    · [place] Tobacco Users Help Line:  (740) 483-3405       Toll Free: 1-970.191.5585  · Quit Tobacco Program [facility] (810)050-7948    DEPRESSION / SUICIDE RISK:  As you are discharged from this [facility], it is important to learn how to keep safe from harming yourself.    Recognize the warning signs:  · Abrupt changes in personality, positive or negative- including increase in energy   · Giving away possessions  · Change in eating patterns- significant weight changes-  positive or negative  · Change in sleeping patterns- unable to sleep or sleeping all the time   · Unwillingness or inability to communicate  · Depression  · Unusual sadness, discouragement and loneliness  · Talk of wanting to die  · Neglect of personal appearance   · Rebelliousness- reckless behavior  · Withdrawal from people/activities they love  · Confusion- inability to concentrate     If you or a loved one observes any of these behaviors or has concerns about self-harm, here's what you can do:  · Talk about it- your feelings and reasons for harming yourself  · Remove any means that you might use to hurt yourself (examples: pills, rope, extension cords, firearm)  · Get professional help from the community (Mental Health, Substance Abuse, psychological counseling)  · Do not be alone:Call your Safe Contact- someone whom you trust who will be there for you.  · Call your local CRISIS HOTLINE 220-4119 or 228-693-6785  · Call your local Children's Mobile Crisis Response Team [place] (299) 105-2381 or www.[employer]  · Call the toll free National Suicide Prevention Hotlines   · National Suicide Prevention Lifeline 514-771-PLBJ  (3783)  · [facility] 800-SUICIDE (608-5817)    DISCHARGE SURVEY:  Thank you for choosing [facility].  We hope we provided you with very good care.  You may be receiving a survey in the mail.  Please fill it out.  Your opinion is valuable to us.    ADDITIONAL EDUCATIONAL MATERIALS GIVEN TO PATIENT:        My signature on this form indicates that:  1.  I have reviewed and understand the above information  2.  My questions regarding this information have been answered to my satisfaction.  3.  I have formulated a plan with my discharge nurse to obtain my prescribed medication for home.

## 2018-04-14 NOTE — PROGRESS NOTES
I gave patient her safe sleep baby book, pink packet, discharge sleep sack and hospital one removed by Chayito MORALES and placed in our laundry. Patient education and discharge instructions reviewed with patient by Chayito MORALES  Discharge nurse Chayito MORALES also did the NBS, removed cord clamp and halo, did the bilizap and pre & post ductal heart screening.    Patient verbalized understanding of instructions with me.  Patient states all questions have been answered and denies any problems.  Patient will discharged home in stable condition with all belongings. Birth certificate complete per Sarah.

## 2018-04-14 NOTE — PROGRESS NOTES
Infant secured in carseat by family.  Infant voiding, stooling, and tolerating feedings well.  Infant discharged to home in stable condition with family.  Mom taken out in wheelchair by transport.

## 2018-04-14 NOTE — CARE PLAN
Problem: Altered physiologic condition related to immediate post-delivery state and potential for bleeding/hemorrhage  Goal: Patient physiologically stable as evidenced by normal lochia, palpable uterine involution and vital signs within normal limits  Outcome: PROGRESSING AS EXPECTED  VSS stable. Fundus firm with scant lochia. Patient denies heavy bleeding.     Problem: Alteration in comfort related to episiotomy, vaginal repair and/or after birth pains  Goal: Patient is able to ambulate, care for self and infant  Outcome: PROGRESSING AS EXPECTED  Patient will call when needing pain medication. Patient ambulating caring for self and infant.

## 2018-04-14 NOTE — PROGRESS NOTES
Received bedside report from MARISA Duggan. Patient in bed, declines pain at this time. Will call when needing pain medication. POC discussed, whiteboards updated. Call light within reach. Patient encouraged to call with any needs and or concerns.

## 2018-04-14 NOTE — PROGRESS NOTES
"Received bedside report  from \"MARISA Ariza\"  on patient and assumed care.  Pt denies need for pain meds or any other questions or needs at this time.  "

## 2018-04-14 NOTE — PROGRESS NOTES
Nursing assessment done.  Plan of care discussed and pt progressing according to caremap.  FOB  at bedside and supportive and translates as needed.  Reviewed infant security measures with pt per hospital protocol.  Advised of emergency cords in pt's room and get well channel.  Pt wearing supportive bra and encouraged to continue.  Pt ambulating well.  See MAR for pain med administration. Pt encouraged to call for any needs. Pt wants to go home today. Pt on regular diet.

## 2018-05-16 NOTE — ADDENDUM NOTE
Encounter addended by: Shilpi Dangelo R.N. on: 5/16/2018  3:29 PM<BR>    Actions taken: Flowsheet accepted